# Patient Record
Sex: FEMALE | Race: BLACK OR AFRICAN AMERICAN | Employment: FULL TIME | URBAN - METROPOLITAN AREA
[De-identification: names, ages, dates, MRNs, and addresses within clinical notes are randomized per-mention and may not be internally consistent; named-entity substitution may affect disease eponyms.]

---

## 2017-01-03 ENCOUNTER — HOSPITAL ENCOUNTER (OUTPATIENT)
Dept: PHYSICAL THERAPY | Age: 42
Discharge: HOME | End: 2017-01-03
Payer: COMMERCIAL

## 2017-01-03 PROCEDURE — 97110 THERAPEUTIC EXERCISES: CPT

## 2017-01-03 PROCEDURE — 97014 ELECTRIC STIMULATION THERAPY: CPT

## 2017-01-03 NOTE — PROGRESS NOTES
PHYSICAL THERAPY - DAILY TREATMENT NOTE    Patient Name: Nino Rick        Date: 1/3/2017  : 1975   YES Patient  Verified  Visit #:   3     Insurance: Payor: Miriammartina Laws / Plan: Lawleandro Cifuentes / Product Type: HMO /      In time: 1:06 PM Out time: 1:36 pm   Total Treatment Time: 30         TREATMENT AREA =  Low back pain [M54.5]  Left sided sciatica [M54.32]    SUBJECTIVE  Pain Level (on 0 to 10 scale): 5  / 10-low back   Medication Changes/New allergies or changes in medical history, any new surgeries or procedures? NO    If yes, update Summary List   Subjective Functional Status/Changes:  []  No changes reported     Its a little irritated right now. I did some housework this morning . \" \"I noticed that the ice keeps it out of my leg. \" \" I noticed I have more muscle spasms\"          OBJECTIVE  Modalities Rationale:     decrease edema, decrease inflammation, decrease pain and increase tissue extensibility to improve patient's ability to perform prolonged walking, sitting    10 min [x] Estim, type/location: Prone low back                                      []  att     [x]  unatt     []  w/US     [x]  w/ice    []  w/heat    min []  Mechanical Traction: type/lbs                   []  pro   []  sup   []  int   []  cont    []  before manual    []  after manual    min []  Ultrasound, settings/location:      min []  Iontophoresis w/ dexamethasone, location:                                               []  take home patch       []  in clinic    min []  Ice     []  Heat    location/position:     min []  Vasopneumatic Device, press/temp:     min []  Other:    [x] Skin assessment post-treatment (if applicable):    [x]  intact    []  redness- no adverse reaction     []redness  adverse reaction:        22 min Therapeutic Exercise:  [x]  See flow sheet   Rationale:      increase ROM and increase strength to improve the patients ability to perform prolonged walking, sitting       4 min Therapeutic Activity: Pt instruction in proper body mechanics for golfers bend, review sleeping positioning   Rationale:    increase ROM and increase strength to improve the patients ability to perform prolonged walking, sitting          min Patient Education:  YES  Reviewed HEP   []  Progressed/Changed HEP based on: Other Objective/Functional Measures: Add FRANCESCA, golfers bend, prone press ups, supine abd draw, hip abd/add     Post Treatment Pain Level (on 0 to 10) scale:   2 / 10-low back     ASSESSMENT  Assessment/Changes in Function:   Pt able to decrease pain after prone press ups. Unable to fully abolish pain during session       []  See Progress Note/Recertification   Patient will continue to benefit from skilled PT services to modify and progress therapeutic interventions, address functional mobility deficits, address ROM deficits, address strength deficits, analyze and modify body mechanics/ergonomics and instruct in home and community integration to attain remaining goals.    Progress toward goals / Updated goals:    Partially met STGs  #1,2 for HEP and centralization of sx     PLAN  []  Upgrade activities as tolerated YES Continue plan of care   []  Discharge due to :    []  Other:      Therapist: Sally Roque PTA    Date: 1/3/2017 Time: 1:36 PM     Future Appointments  Date Time Provider Sabrina Barcenas   1/5/2017 12:00 PM [facility]   1/9/2017 12:30 PM Marcie [facility]   1/11/2017 12:30 PM Sally Roque PTA [facility]   1/17/2017 12:30 PM Sally Roque PTA [facility]

## 2017-01-05 ENCOUNTER — HOSPITAL ENCOUNTER (OUTPATIENT)
Dept: PHYSICAL THERAPY | Age: 42
End: 2017-01-05
Payer: COMMERCIAL

## 2017-01-11 ENCOUNTER — HOSPITAL ENCOUNTER (OUTPATIENT)
Dept: PHYSICAL THERAPY | Age: 42
Discharge: HOME | End: 2017-01-11
Payer: COMMERCIAL

## 2017-01-11 PROCEDURE — 97110 THERAPEUTIC EXERCISES: CPT

## 2017-01-11 PROCEDURE — 97140 MANUAL THERAPY 1/> REGIONS: CPT

## 2017-01-11 PROCEDURE — 97014 ELECTRIC STIMULATION THERAPY: CPT

## 2017-01-11 NOTE — PROGRESS NOTES
PHYSICAL THERAPY - DAILY TREATMENT NOTE    Patient Name: Ramon Glassboro        Date: 2017  : 1975   YES Patient  Verified  Visit #:  4    Insurance: Payor: Paco Barroso / Plan: Ifeanyi Else / Product Type: HMO /      In time: 12:39 pm Out time: 1:27 pm   Total Treatment Time: 48     TREATMENT AREA =  Low back pain [M54.5]  Left sided sciatica [M54.32]    SUBJECTIVE  Pain Level (on 0 to 10 scale):  4  / 10-low back   Medication Changes/New allergies or changes in medical history, any new surgeries or procedures? NO    If yes, update Summary List   Subjective Functional Status/Changes:  []  No changes reported     \"It wasn't hurting today until I turned in my chair. \" pt reports not doing much  \"Its been in my back a lot more. Sometimes it just feels like someone is squeezing me around my hips and back. \"  The night before last night it 7-8/10.  I took a Motrin I didn't want to take a percoet     OBJECTIVE  Modalities Rationale:     decrease edema, decrease inflammation, decrease pain and increase tissue extensibility to improve patient's ability to perform prolonged sitting, standing,walking  10 min [x] Estim, type/location: IFC low back                                     []  att     [x]  unatt     []  w/US     [x]  w/ice    []  w/heat    min []  Mechanical Traction: type/lbs                   []  pro   []  sup   []  int   []  cont    []  before manual    []  after manual    min []  Ultrasound, settings/location:      min []  Iontophoresis w/ dexamethasone, location:                                               []  take home patch       []  in clinic    min []  Ice     []  Heat    location/position:     min []  Vasopneumatic Device, press/temp:     min []  Other:    [x] Skin assessment post-treatment (if applicable):    [x]  intact    []  redness- no adverse reaction     []redness  adverse reaction:        28 min Therapeutic Exercise:  [x]  See flow sheet   Rationale:      increase ROM and increase strength to improve the patients ability to perform prolonged sitting, standing,walking    10 min Manual Therapy: Prone DTM/STM to B Lumbar paraspinals, L piriformis release   Rationale:      decrease pain, increase ROM, increase tissue extensibility and decrease trigger points to improve patient's ability to perform prolonged sitting, standing,walking     min Patient Education:  YES  Reviewed HEP   []  Progressed/Changed HEP based on: Other Objective/Functional Measures:  No radicular sx x ~ 1 week  Add bent knee fall out, dead bug, supine ham stretch with belt   Post Treatment Pain Level (on 0 to 10) scale:   0  / 10     ASSESSMENT  Assessment/Changes in Function:   Review proper body mechanics in sitting, advanced gentle core stabilization exercise. Add manual per plan of care to improve tissue mobility, decrease ms tension B L/S paraspinals. Good pain release after session     []  See Progress Note/Recertification   Patient will continue to benefit from skilled PT services to modify and progress therapeutic interventions, address functional mobility deficits, address ROM deficits, address strength deficits, analyze and address soft tissue restrictions, assess and modify postural abnormalities and instruct in home and community integration to attain remaining goals.    Progress toward goals / Updated goals:  Partially met STG #1,2 for HEP, decreasing radicular sx     PLAN  []  Upgrade activities as tolerated YES Continue plan of care   []  Discharge due to :    []  Other:      Therapist: Altagracia Payan PTA    Date: 1/11/2017 Time: 1:27  PM     Future Appointments  Date Time Provider Sabrina Barcenas   1/17/2017 12:30 PM Altagracia Payan PTA [facility]

## 2017-01-17 ENCOUNTER — HOSPITAL ENCOUNTER (OUTPATIENT)
Dept: PHYSICAL THERAPY | Age: 42
Discharge: HOME | End: 2017-01-17
Payer: COMMERCIAL

## 2017-01-17 PROCEDURE — 97110 THERAPEUTIC EXERCISES: CPT

## 2017-01-17 PROCEDURE — 97140 MANUAL THERAPY 1/> REGIONS: CPT

## 2017-01-17 NOTE — PROGRESS NOTES
PHYSICAL THERAPY - DAILY TREATMENT NOTE    Patient Name: Ole Leblanc        Date: 2017  : 1975   YES Patient  Verified  Visit #:   5     Insurance: Payor: Harsha Montiel / Plan: Angel Meier / Product Type: HMO /      In time: 12:50 pm Out time: 1:43 pm   Total Treatment Time: 53     TREATMENT AREA =  Low back pain [M54.5]  Left sided sciatica [M54.32]    SUBJECTIVE  Pain Level (on 0 to 10 scale):  5 / 10-Left    Medication Changes/New allergies or changes in medical history, any new surgeries or procedures? yes    If yes, update Summary List   Subjective Functional Status/Changes:  []  No changes reported   \"I've been doing really well. I haven't 4 or 5 days until this morning. \"  MRI scheduled today. OBJECTIVE  Modalities Rationale:   Na        44 min Therapeutic Exercise:  [x]  See flow sheet   Rationale:      increase ROM and increase strength to improve the patients ability to perform prolonged sitting     9 min Manual Therapy: Prone DTM/STM to B Lumbar paraspinals, L piriformis release   Rationale:      decrease pain, increase ROM, increase tissue extensibility and decrease trigger points to improve patient's ability to perform prolonged sitting, standing       min Patient Education:  YES  Reviewed HEP   []  Progressed/Changed HEP based on: Other Objective/Functional Measures:     updated written HEP  Add supine 3 way SLR, post hip stretch     Post Treatment Pain Level (on 0 to 10) scale:   3  / 10     ASSESSMENT  Assessment/Changes in Function:     See progress note   []  See Progress Note/Recertification   Patient will continue to benefit from skilled PT services to modify and progress therapeutic interventions, address functional mobility deficits, address ROM deficits, address strength deficits, analyze and address soft tissue restrictions and instruct in home and community integration to attain remaining goals.    Progress toward goals / Updated goals:    See progress note     PLAN  []  Upgrade activities as tolerated YES Continue plan of care   []  Discharge due to :    []  Other:      Therapist: Efren Alvarez PTA    Date: 1/17/2017 Time: 1: 43 PM     Future Appointments  Date Time Provider Sabrina Barcenas   1/19/2017 2:30 PM Domitila Bowens [facility]   1/24/2017 12:30 PM Efren Alvarez PTA [facility]   1/26/2017 12:00 PM Efren Alvarez PTA [facility]

## 2017-01-17 NOTE — PROGRESS NOTES
[address] [facility] PHYSICAL THERAPY AT [address], [place] - Phone: [phone]  Fax: [phone]  PROGRESS NOTE  Patient Name: Chucho Walters : 1975   Treatment/Medical Diagnosis: Low back pain [M54.5]  Left sided sciatica [M54.32]   Referral Source: William Ramirez MD     Date of Initial Visit: 16 Attended Visits: 5 Missed Visits: 2     SUMMARY OF TREATMENT  Pt has completed 5 of -18 visit per original plan of care that have consisted of Therapeutic exercise for lumbar stabilization, ROM, pt education in body mechanics/positioning, activity modification   CURRENT STATUS  Pt has made good progress in centralization of sx, improving Lumbar ROM, and core strength. AROM trunk ext: ~ 30%,  FF: 90%, L SB: 50%-pain L LB, R SB: 70%  Pain: 2-5/10 with sitting, standing, prolonged walking  Functional improvements: decreased radicular sx in L LE. Pt would benefit from continued skilled Physical Therapy intervention to improve flexibility, ROM, decrease pain , core strength, and for education on posture, body mechanics to improve overall functional status and quality of life. Goal/Measure of Progress Goal Met? 1. Patient performing daily HEP. Status at last Eval: na Current Status: Pt instructed in initial HEP. yes   2. Patient able to centralize symptoms with positioning and or exercise to improve ability to stand. Status at last Eval: LLE sx to calf and toes Current Status: No radicular sx x 1-2 weeks yes   3. Increase FOTO to 39 indicating improved function and quality of life. Status at last Eval: 29 Current Status: 45 yes     New Goals to be achieved in _4__  weeks:  1. Patient independent with HEP and able to demo proper body mechanics for floor to chest lifting. 2.  Patient will increase trunk AROM to 50-75% of normal to increase ability to perform household chores.    3.  Increase FOTO to 49 indicating improved function and quality of life. 4.  Increase l hip abd and extension to 4+/5 to facilitate walking. RECOMMENDATIONS  Plan to continue 2x per week x 4 weeks. If you have any questions/comments please contact us directly at  [phone]. Thank you for allowing us to assist in the care of your patient. LPTA Signature: René Castellano PTA  Date: 1/17/2017   PT Signature: Darylene Dike, PT Time: 12:58 PM   NOTE TO PHYSICIAN:  PLEASE COMPLETE THE ORDERS BELOW AND FAX TO   [facility]: [phone]. If you are unable to process this request in 24 hours please contact our office:  [phone].    ___ I have read the above report and request that my patient continue as recommended.   ___ I have read the above report and request that my patient continue therapy with the following changes/special instructions:_________________________________________________________   ___ I have read the above report and request that my patient be discharged from therapy.      Physician Signature:        Date:       Time:

## 2017-01-19 ENCOUNTER — HOSPITAL ENCOUNTER (OUTPATIENT)
Dept: PHYSICAL THERAPY | Age: 42
Discharge: HOME | End: 2017-01-19
Payer: COMMERCIAL

## 2017-01-19 PROCEDURE — 97014 ELECTRIC STIMULATION THERAPY: CPT

## 2017-01-19 PROCEDURE — 97110 THERAPEUTIC EXERCISES: CPT

## 2017-01-19 PROCEDURE — 97140 MANUAL THERAPY 1/> REGIONS: CPT

## 2017-01-19 NOTE — PROGRESS NOTES
PHYSICAL THERAPY - DAILY TREATMENT NOTE    Patient Name: Brenda Hodgkin        Date: 2017  : 1975   YES Patient  Verified  Visit #:   6     Insurance: Payor: Manjit Rick / Plan: Maddy Lynn / Product Type: HMO /      In time: 2:27 pm Out time: 3:15 pm   Total Treatment Time: 48     TREATMENT AREA =  Low back pain [M54.5]  Left sided sciatica [M54.32]    SUBJECTIVE  Pain Level (on 0 to 10 scale):  3  / 10- low back L lateral thigh   Medication Changes/New allergies or changes in medical history, any new surgeries or procedures? NO    If yes, update Summary List   Subjective Functional Status/Changes:  []  No changes reported   \"Last night I had to help someone move some clothes. I had back pain last night and this morning I had some leg pain this morning. \" \" The good thing is I'm having more better days than worse days.  \"         OBJECTIVE  Modalities Rationale:     decrease edema, decrease inflammation, decrease pain and increase tissue extensibility to improve patient's ability to perform bending, lifting  10 min [x] Estim, type/location: IF L/S prone                                      []  att     [x]  unatt     []  w/US     [x]  w/ice    []  w/heat    min []  Mechanical Traction: type/lbs                   []  pro   []  sup   []  int   []  cont    []  before manual    []  after manual    min []  Ultrasound, settings/location:      min []  Iontophoresis w/ dexamethasone, location:                                               []  take home patch       []  in clinic    min []  Ice     []  Heat    location/position:     min []  Vasopneumatic Device, press/temp:     min []  Other:    [x] Skin assessment post-treatment (if applicable):    [x]  intact    []  redness- no adverse reaction     []redness  adverse reaction:        26 min Therapeutic Exercise:  [x]  See flow sheet   Rationale:      increase ROM and increase strength to improve the patients ability to perform lifting, bending    12 min Manual Therapy: L piriformis release , DTM/STM to B L/S    Rationale:      decrease pain, increase ROM, increase tissue extensibility and decrease trigger points to improve patient's ability to  perform lifting, bending       min Patient Education:  YES  Reviewed HEP   []  Progressed/Changed HEP based on: Other Objective/Functional Measures:    Pt education in activity modification for pain centralization. Post Treatment Pain Level (on 0 to 10) scale:   0  / 10     ASSESSMENT  Assessment/Changes in Function:   Pt able to abolish pain with extension based exercise. Noted decreased muscle tension L piriformis after manual techniques     []  See Progress Note/Recertification   Patient will continue to benefit from skilled PT services to modify and progress therapeutic interventions, address functional mobility deficits, address ROM deficits, address strength deficits, analyze and address soft tissue restrictions and instruct in home and community integration to attain remaining goals. Progress toward goals / Updated goals:  1. Patient independent with HEP and able to demo proper body mechanics for floor to chest lifting. 2.  Patient will increase trunk AROM to 50-75% of normal to increase ability to perform household chores. 3.  Increase FOTO to 49 indicating improved function and quality of life.    4.  Increase l hip abd and extension to 4+/5 to facilitate walking          PLAN  []  Upgrade activities as tolerated YES Continue plan of care   []  Discharge due to :    []  Other:      Therapist: Karan Waters PTA    Date: 1/19/2017 Time: 3:15  PM     Future Appointments  Date Time Provider Sabrina Barcenas   1/24/2017 12:30 PM Yossi Banuelos [facility]   1/26/2017 12:00 PM Karan Waters PTA [facility]

## 2017-01-24 ENCOUNTER — HOSPITAL ENCOUNTER (OUTPATIENT)
Dept: PHYSICAL THERAPY | Age: 42
Discharge: HOME | End: 2017-01-24
Payer: COMMERCIAL

## 2017-01-24 PROCEDURE — 97140 MANUAL THERAPY 1/> REGIONS: CPT

## 2017-01-24 PROCEDURE — 97110 THERAPEUTIC EXERCISES: CPT

## 2017-01-24 PROCEDURE — 97014 ELECTRIC STIMULATION THERAPY: CPT

## 2017-01-24 NOTE — PROGRESS NOTES
PHYSICAL THERAPY - DAILY TREATMENT NOTE    Patient Name: Britney Galvez        Date: 2017  : 1975   YES Patient  Verified  Visit #:   7     Insurance: Payor: Radha Ness / Plan: Katherine Potts / Product Type: HMO /      In time: 12:46 pm Out time: 1:36 pm   Total Treatment Time: 50     TREATMENT AREA =  Low back pain [M54.5]  Left sided sciatica [M54.32]    SUBJECTIVE  Pain Level (on 0 to 10 scale):  0  / 10   Medication Changes/New allergies or changes in medical history, any new surgeries or procedures? NO    If yes, update Summary List   Subjective Functional Status/Changes:  []  No changes reported     \"I woke with pain. It could of been maybe it was the way I slept. It was like a 3/10 but I didn't have to take  anything. \"         OBJECTIVE  Modalities Rationale:     decrease edema, decrease inflammation, decrease pain and increase tissue extensibility to improve patient's ability to sit , stand, walk  10 min [x] Estim, type/location: IFC low back                                      []  att     [x]  unatt     []  w/US     []  w/ice    []  w/heat    min []  Mechanical Traction: type/lbs                   []  pro   []  sup   []  int   []  cont    []  before manual    []  after manual    min []  Ultrasound, settings/location:      min []  Iontophoresis w/ dexamethasone, location:                                               []  take home patch       []  in clinic    min []  Ice     []  Heat    location/position:     min []  Vasopneumatic Device, press/temp:     min []  Other:    [x] Skin assessment post-treatment (if applicable):    [x]  intact    []  redness- no adverse reaction     []redness  adverse reaction:        29 min Therapeutic Exercise:  [x]  See flow sheet   Rationale:      increase ROM and increase strength to improve the patients ability to sit , stand, walk     11 min Manual Therapy: Prone B piriformis release, DTM/STM to B lumbar paraspinals with TrPt release Rationale:      decrease pain, increase ROM, increase tissue extensibility and decrease trigger points to improve patient's ability to sit , stand, walk       min Patient Education:  YES  Reviewed HEP   []  Progressed/Changed HEP based on: Other Objective/Functional Measures:  Review HEP, use of towel roll for neutral spine positioning  Add prone hip extension, mini squat with core   Post Treatment Pain Level (on 0 to 10) scale:   0  / 10     ASSESSMENT  Assessment/Changes in Function:   Slow steady progress toward LTG #1, 2 for HEP & Trunk AROM  Advanced core strengthening, LE strengthening  Prone trunk AROM ext: 50%     []  See Progress Note/Recertification   Patient will continue to benefit from skilled PT services to modify and progress therapeutic interventions, address functional mobility deficits, address ROM deficits, address strength deficits, analyze and address soft tissue restrictions and instruct in home and community integration to attain remaining goals. Progress toward goals / Updated goals:  1. Patient independent with HEP and able to demo proper body mechanics for floor to chest lifting. 2.  Patient will increase trunk AROM to 50-75% of normal to increase ability to perform household chores. 3.  Increase FOTO to 49 indicating improved function and quality of life.    4.  Increase l hip abd and extension to 4+/5 to facilitate walking          PLAN  []  Upgrade activities as tolerated YES Continue plan of care   []  Discharge due to :    []  Other:      Therapist: Yasmani Barnhart PTA    Date: 1/24/2017 Time: 1:36 PM     Future Appointments  Date Time Provider Sabrina Barcenas   1/26/2017 12:00 PM Yasmani Barnhart PTA [facility]

## 2017-01-26 ENCOUNTER — HOSPITAL ENCOUNTER (OUTPATIENT)
Dept: PHYSICAL THERAPY | Age: 42
Discharge: HOME | End: 2017-01-26
Payer: COMMERCIAL

## 2017-01-26 PROCEDURE — 97110 THERAPEUTIC EXERCISES: CPT

## 2017-01-26 PROCEDURE — 97014 ELECTRIC STIMULATION THERAPY: CPT

## 2017-01-26 PROCEDURE — 97140 MANUAL THERAPY 1/> REGIONS: CPT

## 2017-01-26 NOTE — PROGRESS NOTES
PHYSICAL THERAPY - DAILY TREATMENT NOTE    Patient Name: Maximus Solorio        Date: 2017  : 1975   YES Patient  Verified  Visit #:  8     Insurance: Payor: Rigo Nelson / Plan: Lee Em / Product Type: HMO /      In time: 12:08 pm Out time: 12:50 pm   Total Treatment Time: 42         TREATMENT AREA =  Low back pain [M54.5]  Left sided sciatica [M54.32]    SUBJECTIVE  Pain Level (on 0 to 10 scale):  3 / 10   Medication Changes/New allergies or changes in medical history, any new surgeries or procedures? NO    If yes, update Summary List   Subjective Functional Status/Changes:  []  No changes reported     :Last night was like a 6/10. I was in training yesterday for like 3 hours. \"         OBJECTIVE  Modalities Rationale:     decrease edema, decrease inflammation, decrease pain and increase tissue extensibility to improve patient's ability to perform prolonged sitting, standing  10 min [x] Estim, type/location: IFC low back prone                                     []  att     [x]  unatt     []  w/US     [x]  w/ice    []  w/heat    min []  Mechanical Traction: type/lbs                   []  pro   []  sup   []  int   []  cont    []  before manual    []  after manual    min []  Ultrasound, settings/location:      min []  Iontophoresis w/ dexamethasone, location:                                               []  take home patch       []  in clinic    min []  Ice     []  Heat    location/position:     min []  Vasopneumatic Device, press/temp:     min []  Other:    [x] Skin assessment post-treatment (if applicable):    [x]  intact    []  redness- no adverse reaction     []redness  adverse reaction:        20 min Therapeutic Exercise:  [x]  See flow sheet   Rationale:      increase ROM and increase strength to improve the patients ability to prolonged sitting     12 min Manual Therapy: Prone DTM/STM to B lumbar paraspinals   Rationale:      decrease pain, increase ROM, increase tissue extensibility and decrease trigger points to improve patient's ability to improve tissue mobility for prolonged standing, walking       min Patient Education:  YES  Reviewed HEP   []  Progressed/Changed HEP based on: Other Objective/Functional Measures:  Review standing core strengthening exercise     Post Treatment Pain Level (on 0 to 10) scale:   0  / 10     ASSESSMENT  Assessment/Changes in Function:   VCs & demo for review of proper mini squat technique for hip hinge, core engagement, equal weight bearing through B LE's. Decreased core stabilization exercise secondary to increase ms tension/pain this session. Resume as tolerated. FRANCESCA eased LBP. Unable to abolish pain with exercise. Noted increased ms tension in B lumbar paraspinals, R piriformis regions     []  See Progress Note/Recertification   Patient will continue to benefit from skilled PT services to modify and progress therapeutic interventions, address functional mobility deficits, address ROM deficits, address strength deficits, analyze and address soft tissue restrictions and instruct in home and community integration to attain remaining goals. Progress toward goals / Updated goals:  1. Patient independent with HEP and able to demo proper body mechanics for floor to chest lifting. 2.  Patient will increase trunk AROM to 50-75% of normal to increase ability to perform household chores. 3.  Increase FOTO to 49 indicating improved function and quality of life.    4.  Increase l hip abd and extension to 4+/5 to facilitate walking          PLAN  []  Upgrade activities as tolerated YES Continue plan of care   []  Discharge due to :    []  Other:      Therapist: Cristino Pallas, PTA    Date: 1/26/2017 Time: 12:50 PM     Future Appointments  Date Time Provider Sabrina Barcenas   1/31/2017 12:30 PM Cristino Pallas, PTA [facility]

## 2017-01-30 ENCOUNTER — HOSPITAL ENCOUNTER (OUTPATIENT)
Dept: PHYSICAL THERAPY | Age: 42
Discharge: HOME | End: 2017-01-30
Payer: COMMERCIAL

## 2017-01-30 PROCEDURE — 97014 ELECTRIC STIMULATION THERAPY: CPT

## 2017-01-30 PROCEDURE — 97140 MANUAL THERAPY 1/> REGIONS: CPT

## 2017-01-30 PROCEDURE — 97110 THERAPEUTIC EXERCISES: CPT

## 2017-01-30 NOTE — PROGRESS NOTES
PHYSICAL THERAPY - DAILY TREATMENT NOTE    Patient Name: Newton Laser        Date: 2017  : 1975   YES Patient  Verified  Visit #:     Insurance: Payor: Martha Ramos / Plan: Dorita Hands / Product Type: HMO /      In time: 12:16 pm Out time: 1:03 pm   Total Treatment Time: 47         TREATMENT AREA =  Low back pain [M54.5]  Left sided sciatica [M54.32]    SUBJECTIVE  Pain Level (on 0 to 10 scale):  5  / 10-low back   Medication Changes/New allergies or changes in medical history, any new surgeries or procedures? NO    If yes, update Summary List   Subjective Functional Status/Changes:  []  No changes reported   Pt reports radicular pain-  \"just once over the weekend and I iced and took a Percocet\" pt reports her low back and HA did not start until she went to work. She was very busy and thinks it may have come from her swivel chairs.          OBJECTIVE  Modalities Rationale:     decrease edema, decrease inflammation, decrease pain and increase tissue extensibility to improve patient's ability to prolonged sitting  10 min [x] Estim, type/location: IFC low back, post hip                                     []  att     [x]  unatt     []  w/US     [x]  w/ice    []  w/heat    min []  Mechanical Traction: type/lbs                   []  pro   []  sup   []  int   []  cont    []  before manual    []  after manual    min []  Ultrasound, settings/location:      min []  Iontophoresis w/ dexamethasone, location:                                               []  take home patch       []  in clinic    min []  Ice     []  Heat    location/position:     min []  Vasopneumatic Device, press/temp:     min []  Other:    [x] Skin assessment post-treatment (if applicable):    [x]  intact    []  redness- no adverse reaction     []redness  adverse reaction:        25 min Therapeutic Exercise:  [x]  See flow sheet   Rationale:      increase ROM and increase strength to improve the patients ability to prolonged sitting    12 min Manual Therapy: Prone DTM/STM to B lumbar paraspinals, L piriformis release   Rationale:      decrease pain, increase ROM, increase tissue extensibility and decrease trigger points to improve patient's ability to prolonged sitting       min Patient Education:  YES  Reviewed HEP   []  Progressed/Changed HEP based on: Other Objective/Functional Measures:    Prone trunk extension AROM: ~ 50%     Post Treatment Pain Level (on 0 to 10) scale:   3  / 10-low back     ASSESSMENT  Assessment/Changes in Function:   Resume supine exercise . Pt would benefit from continued core stabilization strengthening. Pt partially met LTG #2 for trunk AROM     []  See Progress Note/Recertification   Patient will continue to benefit from skilled PT services to modify and progress therapeutic interventions, address functional mobility deficits, address ROM deficits, address strength deficits and instruct in home and community integration to attain remaining goals. Progress toward goals / Updated goals:  1. Patient independent with HEP and able to demo proper body mechanics for floor to chest lifting. 2.  Patient will increase trunk AROM to 50-75% of normal to increase ability to perform household chores. 3.  Increase FOTO to 49 indicating improved function and quality of life.    4.  Increase l hip abd and extension to 4+/5 to facilitate walking          PLAN  []  Upgrade activities as tolerated YES Continue plan of care   []  Discharge due to :    []  Other:      Therapist: Anne Hitchcock PTA    Date: 1/30/2017 Time: 1:03 PM     Future Appointments  Date Time Provider Sabrina Barcenas   [facility]   2/2/2017 11:30 AM Anne Hitchcock PTA [facility]

## 2017-01-31 ENCOUNTER — APPOINTMENT (OUTPATIENT)
Dept: PHYSICAL THERAPY | Age: 42
End: 2017-01-31
Payer: COMMERCIAL

## 2017-02-02 ENCOUNTER — HOSPITAL ENCOUNTER (OUTPATIENT)
Dept: PHYSICAL THERAPY | Age: 42
Discharge: HOME | End: 2017-02-02
Payer: COMMERCIAL

## 2017-02-02 PROCEDURE — 97140 MANUAL THERAPY 1/> REGIONS: CPT

## 2017-02-02 PROCEDURE — 97110 THERAPEUTIC EXERCISES: CPT

## 2017-02-02 NOTE — PROGRESS NOTES
PHYSICAL THERAPY - DAILY TREATMENT NOTE    Patient Name: Ole Leblanc        Date: 2017  : 1975   YES Patient  Verified  Visit #:  10  of   12-16  Insurance: Payor: Harsha Montiel / Plan: Angel Meier / Product Type: HMO /      In time: 11:40 am Out time: 12:16 pm   Total Treatment Time: 36     TREATMENT AREA =  Low back pain [M54.5]  Left sided sciatica [M54.32]    SUBJECTIVE  Pain Level (on 0 to 10 scale):  3  / 10   Medication Changes/New allergies or changes in medical history, any new surgeries or procedures? NO    If yes, update Summary List   Subjective Functional Status/Changes:  []  No changes reported     Pt reports low back and pain that goes from anterior thigh to L knee since yesterday. RTW full time Tuesday. Pt reports Wednesday was a non stop day and pain gradually increased throughout the day         OBJECTIVE  Modalities Rationale: na        23 min Therapeutic Exercise:  [x]  See flow sheet   Rationale:      increase ROM and increase strength to improve the patients ability to perform sitting, standing , walking     13 min Manual Therapy: Prone DTM/STM to B lumbar paraspinals, L piriformis release   Rationale:      decrease pain, increase ROM, increase tissue extensibility and decrease trigger points to improve patient's ability to perform sitting, standing , walking         min Patient Education:  YES  Reviewed HEP   []  Progressed/Changed HEP based on: Other Objective/Functional Measures:    Review positioning, HEP, activity modification for pain management     Post Treatment Pain Level (on 0 to 10) scale:   2 / 10- Left hip     ASSESSMENT  Assessment/Changes in Function:   Hold estim & ice secondary to pt time constraints. Prone extension based exercise centralized pain to low back. Unable to abolish with exercise.  TrPt tenderness in L piriformis     []  See Progress Note/Recertification   Patient will continue to benefit from skilled PT services to modify and progress therapeutic interventions, address functional mobility deficits, address ROM deficits, address strength deficits, analyze and address soft tissue restrictions and instruct in home and community integration to attain remaining goals. Progress toward goals / Updated goals:  . Patient independent with HEP and able to demo proper body mechanics for floor to chest lifting. 2.  Patient will increase trunk AROM to 50-75% of normal to increase ability to perform household chores. 3.  Increase FOTO to 49 indicating improved function and quality of life. 4.  Increase l hip abd and extension to 4+/5 to facilitate walking          PLAN  []  Upgrade activities as tolerated YES Continue plan of care   []  Discharge due to :    []  Other:      Therapist: Fawn Parada PTA    Date: 2/2/2017 Time: 12:16 pm     No future appointments.

## 2017-02-06 ENCOUNTER — HOSPITAL ENCOUNTER (OUTPATIENT)
Dept: PHYSICAL THERAPY | Age: 42
Discharge: HOME | End: 2017-02-06
Payer: COMMERCIAL

## 2017-02-06 PROCEDURE — 97014 ELECTRIC STIMULATION THERAPY: CPT

## 2017-02-06 PROCEDURE — 97110 THERAPEUTIC EXERCISES: CPT

## 2017-02-06 NOTE — PROGRESS NOTES
PHYSICAL THERAPY - DAILY TREATMENT NOTE    Patient Name: Nicholas Hunter        Date: 2017  : 1975   YES Patient  Verified  Visit #:    Insurance: Payor: Obdulia Unger / Plan: Najma Avilez / Product Type: HMO /      In time: 12:03 pm Out time: 12:45 pm   Total Treatment Time: 42     TREATMENT AREA =  Low back pain [M54.5]  Left sided sciatica [M54.32]    SUBJECTIVE  Pain Level (on 0 to 10 scale):  0  / 10   Medication Changes/New allergies or changes in medical history, any new surgeries or procedures? NO    If yes, update Summary List   Subjective Functional Status/Changes:  []  No changes reported     \"I rested over the weekend. I had a little pain yesterday. \"         OBJECTIVE  Modalities Rationale:     decrease edema, decrease inflammation, decrease pain and increase tissue extensibility to improve patient's ability to prolonged sitting, standing, walking  10 min [x] Estim, type/location: IFC low back                                     []  att     [x]  unatt     []  w/US     [x]  w/ice    []  w/heat    min []  Mechanical Traction: type/lbs                   []  pro   []  sup   []  int   []  cont    []  before manual    []  after manual    min []  Ultrasound, settings/location:      min []  Iontophoresis w/ dexamethasone, location:                                               []  take home patch       []  in clinic    min []  Ice     []  Heat    location/position:     min []  Vasopneumatic Device, press/temp:     min []  Other:    [] Skin assessment post-treatment (if applicable):    []  intact    []  redness- no adverse reaction     []redness  adverse reaction:        32 min Therapeutic Exercise:  [x]  See flow sheet   Rationale:      increase ROM and increase strength to improve the patients ability to perform prolonged sitting, standing, walking      min Patient Education:  YES  Reviewed HEP   []  Progressed/Changed HEP based on:        Other Objective/Functional Measures: FOTO: 79     Post Treatment Pain Level (on 0 to 10) scale:   0/ 10     ASSESSMENT  Assessment/Changes in Function:     See progress note     [x]  See Progress Note/Recertification   Patient will continue to benefit from skilled PT services to modify and progress therapeutic interventions, address functional mobility deficits, address ROM deficits, address strength deficits, analyze and address soft tissue restrictions and instruct in home and community integration to attain remaining goals. Progress toward goals / Updated goals:  See progress note     PLAN  []  Upgrade activities as tolerated YES Continue plan of care   []  Discharge due to :    []  Other:      Therapist: Christina Pichardo PTA    Date: 2/6/2017 Time: 12:45 PM     No future appointments.

## 2017-02-06 NOTE — PROGRESS NOTES
[address] [place] [address] PHYSICAL THERAPY AT [place]  [address] [facility] [address] - Phone: [phone]  Fax: [phone]  PROGRESS NOTE  Patient Name: Favian Fishman : 1975   Treatment/Medical Diagnosis: Low back pain [M54.5]  Left sided sciatica [M54.32]   Referral Source: Billy Rios MD     Date of Initial Visit: 16 Attended Visits: 11 Missed Visits: 2     SUMMARY OF TREATMENT  Physical Therapy treatment has consisted in Therapeuic exercise for lumbar stabilization , ROM, HEP, Manual Therapy, Interferential electrical stimulation, and ice. CURRENT STATUS  Pt reports radicular pain yesterday . Pt able to abolish pain with use of ice. Prior to yesterday no radicular sx > ~ 2 weeks. Pain: 2-3/10. Pt reports ~ 75-80% overall improvement. Functional deficits: prolonged sitting, standing, walking  Functional improvements: decreasing pain and radicular sx. Goal/Measure of Progress Goal Met? 1. Patient independent with HEP and able to demo proper body mechanics for floor to chest lifting. Status at last Eval: na Current Status: Pt instructed in initial HEP, body mechanics in lifting  partially   2. Patient will increase trunk AROM to 50-75% of normal to increase ability to perform household chores. Status at last Eval: AROM trunk ext: ~ 30%, FF: 90%, L SB: 50%-pain L LB, R SB: 70% Current Status: AROM: FF: 95%  Ext:90%-slight pain  LSB:90%  RSB: 90%  B rot: 80% yes   3. Increase FOTO to 49 indicating improved function and quality of life. Status at last Eval: 29 Current Status: 67 yes   4. Increase l hip abd and extension to 4+/5 to facilitate walking   Status at last Eval: Hip ext: L: 3+/5, L hip ABD: 3+/5 Current Status: MMT hip ext: L:  4/5, hip ABD: L: 3+/5, R: 5/5 progressing     New Goals to be achieved in _4__  treatments:  1. Patient independent with HEP and able to demo proper body mechanics for floor to chest lifting.    2. Increase FOTO to 70  indicating improved function and quality of life. 3.  Increase L hip abd and extension to 4/5 to facilitate walking     RECOMMENDATIONS  Plan to continue 2x per week x 4 weeks  If you have any questions/comments please contact us directly at  [phone]. Thank you for allowing us to assist in the care of your patient. LPTA Signature: Paula George, PTA  Date: 2/6/2017   PT Signature: Juan Pina PT Time: 12:06 PM   NOTE TO PHYSICIAN:  PLEASE COMPLETE THE ORDERS BELOW AND FAX TO   [facility] Physical Therapy: [phone]. If you are unable to process this request in 24 hours please contact our office:  [phone].    ___ I have read the above report and request that my patient continue as recommended.   ___ I have read the above report and request that my patient continue therapy with the following changes/special instructions:_________________________________________________________   ___ I have read the above report and request that my patient be discharged from therapy.      Physician Signature:        Date:       Time:

## 2017-02-10 ENCOUNTER — HOSPITAL ENCOUNTER (OUTPATIENT)
Dept: PHYSICAL THERAPY | Age: 42
Discharge: HOME | End: 2017-02-10
Payer: COMMERCIAL

## 2017-02-10 PROCEDURE — 97140 MANUAL THERAPY 1/> REGIONS: CPT

## 2017-02-10 PROCEDURE — 97110 THERAPEUTIC EXERCISES: CPT

## 2017-02-10 PROCEDURE — 97014 ELECTRIC STIMULATION THERAPY: CPT

## 2017-02-10 NOTE — PROGRESS NOTES
PHYSICAL THERAPY - DAILY TREATMENT NOTE    Patient Name: Ramon [place]        Date: 2/10/2017  : 1975   YES Patient  Verified  Visit #:   (12) 1   of   4  Insurance: Payor: Paco Barroso / Plan: Ifeanyi Else / Product Type: HMO /      In time: 11:02 am Out time: 11:53am   Total Treatment Time: 51     Medicare Time Tracking (below)   Total Timed Codes (min):  n/a 1:1 Treatment Time:  36     TREATMENT AREA =  Low back pain [M54.5]  Left sided sciatica [M54.32]  SUBJECTIVE  Pain Level (on 0 to 10 scale):  4  / 10   Medication Changes/New allergies or changes in medical history, any new surgeries or procedures? NO    If yes, update Summary List   Subjective Functional Status/Changes:  []  No changes reported     Pt states \"i did a percoset last night, yesterday was almost a 7 and a very stressful day\"       OBJECTIVE  Modalities Rationale:     decrease pain to improve patient's ability to perform functional ADLs  10 min [x] Estim, type/location: IFC low back                                     []  att     [x]  unatt     []  w/US     [x]  w/ice    []  w/heat    min []  Mechanical Traction: type/lbs                   []  pro   []  sup   []  int   []  cont    []  before manual    []  after manual    min []  Ultrasound, settings/location:      min []  Iontophoresis w/ dexamethasone, location:                                               []  take home patch       []  in clinic    min []  Ice     []  Heat    location/position:     min []  Vasopneumatic Device, press/temp:     min []  Other:    [x] Skin assessment post-treatment (if applicable):    [x]  intact    []  redness- no adverse reaction     []redness  adverse reaction:        33 (bill 28) min Therapeutic Exercise:  [x]  See flow sheet   Rationale:      increase ROM and increase strength to improve the patients ability to perform ADLs.      8 min Manual Therapy: Sustained pressure L>R piriformis,  MET to correct left post rot innominate Rationale:      decrease pain, increase ROM, increase tissue extensibility and decrease trigger points in L/S to improve patient's ability to tolerate prolonged standing. min Patient Education:  YES  Reviewed HEP   []  Progressed/Changed HEP based on: Other Objective/Functional Measures: Added wall slide and progressed hip ABD to clam  n nwith good pt tolerance and no complaints of sx. FF and stork = (+) left Si hypomobility      Post Treatment Pain Level (on 0 to 10) scale:   0  / 10     ASSESSMENT  Assessment/Changes in Function:     Pt presented with increased TTP and mm tone in left piriformis. Presented with left SI hypomobility. Progressed JEANNETTE LE strengthening as appropriate. []  See Progress Note/Recertification   Patient will continue to benefit from skilled PT services to modify and progress therapeutic interventions, address functional mobility deficits, address ROM deficits, address strength deficits, analyze and address soft tissue restrictions, analyze and cue movement patterns, analyze and modify body mechanics/ergonomics and assess and modify postural abnormalities to attain remaining goals. Progress toward goals / Updated goals:    Progressing towards newly established LTGs.       PLAN  [x]  Upgrade activities as tolerated YES Continue plan of care   []  Discharge due to :    []  Other:      Therapist: Surya Ramos DPT     Date: 2/10/2017 Time: 10:28 AM     Future Appointments  Date Time Provider Sabrina Barcenas   2/10/2017 11:00 AM [address]

## 2017-02-15 ENCOUNTER — HOSPITAL ENCOUNTER (OUTPATIENT)
Dept: PHYSICAL THERAPY | Age: 42
Discharge: HOME | End: 2017-02-15
Payer: COMMERCIAL

## 2017-02-15 PROCEDURE — 97140 MANUAL THERAPY 1/> REGIONS: CPT

## 2017-02-15 PROCEDURE — 97110 THERAPEUTIC EXERCISES: CPT

## 2017-02-15 PROCEDURE — 97014 ELECTRIC STIMULATION THERAPY: CPT

## 2017-02-15 NOTE — PROGRESS NOTES
PHYSICAL THERAPY - DAILY TREATMENT NOTE    Patient Name: Lj Conley        Date: 2/15/2017  : 1975   YES Patient  Verified  Visit #:   (22) 2   of   4  Insurance: Payor: Malka Coleman / Plan: Art Fang / Product Type: HMO /      In time: 12:33pm Out time: 1:30pm   Total Treatment Time: 62     Medicare Time Tracking (below)   Total Timed Codes (min):  n/a 1:1 Treatment Time:  n/a     TREATMENT AREA =  Low back pain [M54.5]  Left sided sciatica [M54.32]  SUBJECTIVE  Pain Level (on 0 to 10 scale):  0  / 10   Medication Changes/New allergies or changes in medical history, any new surgeries or procedures? NO    If yes, update Summary List   Subjective Functional Status/Changes:  []  No changes reported     Pt states \"I dont have any pain but i just feel tight and stiff\"         OBJECTIVE  Modalities Rationale:     decrease pain to improve patient's ability to perform functional ADLs  10 min [x] Estim, type/location: IFC to back in prone                                     []  att     [x]  unatt     []  w/US     []  w/ice    [x]  w/heat    min []  Mechanical Traction: type/lbs                   []  pro   []  sup   []  int   []  cont    []  before manual    []  after manual    min []  Ultrasound, settings/location:      min []  Iontophoresis w/ dexamethasone, location:                                               []  take home patch       []  in clinic    min []  Ice     []  Heat    location/position:     min []  Vasopneumatic Device, press/temp:     min []  Other:    [x] Skin assessment post-treatment (if applicable):    [x]  intact    []  redness- no adverse reaction     []redness  adverse reaction:        38 min Therapeutic Exercise:  [x]  See flow sheet   Rationale:      increase ROM and increase strength to improve the patients ability to perform ADLs.      9 min Manual Therapy: STM to L QL and erector spinae, sustained pressure to left piriformis   Rationale:      decrease pain, increase ROM, increase tissue extensibility and decrease trigger points in L/S to improve patient's ability to tolerate prolonged standing. min Patient Education:  YES  Reviewed HEP   []  Progressed/Changed HEP based on: Other Objective/Functional Measures: Added posterior hip stretch and SLR with core with good pt tolerance and no complaints of sx. Post Treatment Pain Level (on 0 to 10) scale:   0  / 10     ASSESSMENT  Assessment/Changes in Function:     Pt presented with increased TTP and mm tone in left QL and piriformis. Progressed flexibility therex as appropriate. []  See Progress Note/Recertification   Patient will continue to benefit from skilled PT services to modify and progress therapeutic interventions, address functional mobility deficits, address ROM deficits, address strength deficits, analyze and address soft tissue restrictions, analyze and cue movement patterns, analyze and modify body mechanics/ergonomics and assess and modify postural abnormalities to attain remaining goals. Progress toward goals / Updated goals:    Progressing towards LTG 1 and 4.       PLAN  [x]  Upgrade activities as tolerated YES Continue plan of care   []  Discharge due to :    []  Other:      Therapist: Soraya Valle DPT     Date: 2/15/2017 Time: 10:37 AM     Future Appointments  Date Time Provider Sabrina Barcenas   2/15/2017 12:30 PM Chantal Payton [facility]   2/17/2017 10:30 AM Jaja Dsouza PTA [facility]

## 2017-02-17 ENCOUNTER — HOSPITAL ENCOUNTER (OUTPATIENT)
Dept: PHYSICAL THERAPY | Age: 42
Discharge: HOME | End: 2017-02-17
Payer: COMMERCIAL

## 2017-02-17 PROCEDURE — 97014 ELECTRIC STIMULATION THERAPY: CPT

## 2017-02-17 NOTE — PROGRESS NOTES
PHYSICAL THERAPY - DAILY TREATMENT NOTE    Patient Name: Atilio Regan        Date: 2017  : 1975   YES Patient  Verified  Visit #:   (81) 3   of   8  Insurance: Payor: Albaro Barragan / Plan: Aaron Montano / Product Type: HMO /      In time: 10:30 Out time: 10:43   Total Treatment Time: 13     Medicare Time Tracking (below)   Total Timed Codes (min):  n/a 1:1 Treatment Time:  n/a     TREATMENT AREA =  Low back pain [M54.5]  Left sided sciatica [M54.32]  SUBJECTIVE  Pain Level (on 0 to 10 scale):  0  / 10   Medication Changes/New allergies or changes in medical history, any new surgeries or procedures?     NO    If yes, update Summary List   Subjective Functional Status/Changes:  []  No changes reported     Pt states \"I was in a little bit of pain yesterday, i was running around to appointments all the way in [place] news it was a lot of driving and i just think i did too  Much, today i have to be at an appointment by 11, so can i just do the electrodes because I did my exercise this morning \"         OBJECTIVE  Modalities Rationale:     decrease pain to improve patient's ability to perform functional ADLs  10 min [x] Estim, type/location: IFC to L/S in prone lying with heat                                    []  att     [x]  unatt     []  w/US     []  w/ice    [x]  w/heat    min []  Mechanical Traction: type/lbs                   []  pro   []  sup   []  int   []  cont    []  before manual    []  after manual    min []  Ultrasound, settings/location:      min []  Iontophoresis w/ dexamethasone, location:                                               []  take home patch       []  in clinic    min []  Ice     []  Heat    location/position:     min []  Vasopneumatic Device, press/temp:     min []  Other:    [x] Skin assessment post-treatment (if applicable):    [x]  intact    []  redness- no adverse reaction     []redness  adverse reaction:         min Patient Education:  YES  Reviewed HEP   [] Progressed/Changed HEP based on: Other Objective/Functional Measures:    Held therex and manual secondary to patient time constraints. Post Treatment Pain Level (on 0 to 10) scale:   0  / 10     ASSESSMENT  Assessment/Changes in Function:     Resume regular tx program next visit. []  See Progress Note/Recertification   Patient will continue to benefit from skilled PT services to modify and progress therapeutic interventions, address functional mobility deficits, address ROM deficits, address strength deficits, analyze and address soft tissue restrictions, analyze and cue movement patterns, analyze and modify body mechanics/ergonomics and assess and modify postural abnormalities to attain remaining goals. Progress toward goals / Updated goals:    Progressing towards STG 4.       PLAN  [x]  Upgrade activities as tolerated YES Continue plan of care   []  Discharge due to :    []  Other:      Therapist: Chepe Swift DPT     Date: 2/17/2017 Time: 10:32 AM     Future Appointments  Date Time Provider Sabrina Barcenas   2/20/2017 12:30 PM Vicky Jaimes PTA [facility]   2/24/2017 10:30 AM Vicky Jaimes PTA [facility]   3/1/2017 12:00 PM [address]   3/3/2017 11:30 AM [address]

## 2017-02-20 ENCOUNTER — HOSPITAL ENCOUNTER (OUTPATIENT)
Dept: PHYSICAL THERAPY | Age: 42
Discharge: HOME | End: 2017-02-20
Payer: COMMERCIAL

## 2017-02-20 PROCEDURE — 97014 ELECTRIC STIMULATION THERAPY: CPT

## 2017-02-20 PROCEDURE — 97110 THERAPEUTIC EXERCISES: CPT

## 2017-02-20 NOTE — PROGRESS NOTES
PHYSICAL THERAPY - DAILY TREATMENT NOTE    Patient Name: Hansel Bettencourt        Date: 2017  : 1975   YES Patient  Verified  Visit #:   (89) 4   of   8  Insurance: Payor: Goldie Salmon / Plan: Naun Agarwal / Product Type: HMO /      In time: 12:38 pm Out time: 1:14 pm   Total Treatment Time: 36         TREATMENT AREA =  Low back pain [M54.5]  Left sided sciatica [M54.32]    SUBJECTIVE  Pain Level (on 0 to 10 scale):  0/ 10-\"I just feel pressure. \"   Medication Changes/New allergies or changes in medical history, any new surgeries or procedures? NO    If yes, update Summary List   Subjective Functional Status/Changes:  []  No changes reported     \"I only get pain recently if I do something too long. If I stand too long or sit too long. \"  Pt reports she has pain management apt @ 1:30 pm. Pt reports MD wanted her to come to PT another 4 weeks @ last apt vs 2 weeks.        OBJECTIVE  Modalities Rationale:     decrease edema, decrease inflammation, decrease pain and increase tissue extensibility to improve patient's ability to perform prolonged sitting  10 min [x] Estim, type/location: IFC low back prone                                     []  att     [x]  unatt     []  w/US     []  w/ice    [x]  w/heat    min []  Mechanical Traction: type/lbs                   []  pro   []  sup   []  int   []  cont    []  before manual    []  after manual    min []  Ultrasound, settings/location:      min []  Iontophoresis w/ dexamethasone, location:                                               []  take home patch       []  in clinic    min []  Ice     []  Heat    location/position:     min []  Vasopneumatic Device, press/temp:     min []  Other:    [x] Skin assessment post-treatment (if applicable):    [x]  intact    []  redness- no adverse reaction     []redness  adverse reaction:        26 min Therapeutic Exercise:  [x]  See flow sheet   Rationale:      increase ROM and increase strength to improve the patients ability to perform functional ADLs         min Patient Education:  YES  Reviewed HEP   []  Progressed/Changed HEP based on: Other Objective/Functional Measures:    Hold supine exercise per flow sheet secondary to pt time constraints     Post Treatment Pain Level (on 0 to 10) scale:   0  / 10-no pressure     ASSESSMENT  Assessment/Changes in Function:     Increased wall slides to 3x 20 seconds. Pt reported increased ms tension with t band rows- VCs to adjust stance and distance from bands eased tension. []  See Progress Note/Recertification   Patient will continue to benefit from skilled PT services to modify and progress therapeutic interventions, address functional mobility deficits, address ROM deficits, address strength deficits and instruct in home and community integration to attain remaining goals. Progress toward goals / Updated goals:  1. Patient independent with HEP and able to demo proper body mechanics for floor to chest lifting. 2.  Increase FOTO to 70  indicating improved function and quality of life.    3.  Increase L hip abd and extension to 4/5 to facilitate walking-on going          PLAN  []  Upgrade activities as tolerated YES Continue plan of care   []  Discharge due to :    []  Other:      Therapist: Tosin Nguyen PTA    Date: 2/20/2017 Time: 1: 14  PM     Future Appointments  Date Time Provider Sabrina Barcenas   2/24/2017 10:30 AM Tosin Nguyen PTA [facility]   3/1/2017 12:00 PM [address]   3/3/2017 11:30 AM [address]

## 2017-02-24 ENCOUNTER — HOSPITAL ENCOUNTER (OUTPATIENT)
Dept: PHYSICAL THERAPY | Age: 42
Discharge: HOME | End: 2017-02-24
Payer: COMMERCIAL

## 2017-02-24 PROCEDURE — 97014 ELECTRIC STIMULATION THERAPY: CPT

## 2017-02-24 NOTE — PROGRESS NOTES
PHYSICAL THERAPY - DAILY TREATMENT NOTE    Patient Name: Clara Phillips        Date: 2017  : 1975   YES Patient  Verified  Visit #:   (06) 5  of   8  Insurance: Payor: Sheldon Cunningham / Plan: Sandra Bedoya / Product Type: HMO /      In time: 10:45  Out time: 11:00 am   Total Treatment Time: 10         TREATMENT AREA =  Low back pain [M54.5]  Left sided sciatica [M54.32]    SUBJECTIVE  Pain Level (on 0 to 10 scale):  0  / 10   Medication Changes/New allergies or changes in medical history, any new surgeries or procedures? NO    If yes, update Summary List   Subjective Functional Status/Changes:  []  No changes reported     Pt report pain Left thigh pain 17 \"It was a long time ago since I had any pain. \"  Saw pain medicine MD.       OBJECTIVE  Modalities Rationale:     decrease edema, decrease inflammation, decrease pain and increase tissue extensibility to improve patient's ability to perform prolonged sitting, bending, lifting  10 min [x] Estim, type/location: IFC low back prone                                     []  att     [x]  unatt     []  w/US     [x]  w/ice    []  w/heat    min []  Mechanical Traction: type/lbs                   []  pro   []  sup   []  int   []  cont    []  before manual    []  after manual    min []  Ultrasound, settings/location:      min []  Iontophoresis w/ dexamethasone, location:                                               []  take home patch       []  in clinic    min []  Ice     []  Heat    location/position:     min []  Vasopneumatic Device, press/temp:     min []  Other:    [x] Skin assessment post-treatment (if applicable):    [x]  intact    []  redness- no adverse reaction     []redness  adverse reaction:             min Patient Education:  YES  Reviewed HEP   []  Progressed/Changed HEP based on: Other Objective/Functional Measures:    Hold there ex secondary to pt time constraints.       Post Treatment Pain Level (on 0 to 10) scale:   0  / 10 ASSESSMENT  Assessment/Changes in Function:   Recommended to pt to save future visits  For progression of exercise advancement for discharge planning, continued progress toward all goals. Pt reported mild soreness in L thigh after last session. []  See Progress Note/Recertification   Patient will continue to benefit from skilled PT services to modify and progress therapeutic interventions, address functional mobility deficits, address ROM deficits and address strength deficits to attain remaining goals. Progress toward goals / Updated goals:  Resume there ex next visit.  Progress toward all current LTGs     PLAN  []  Upgrade activities as tolerated YES Continue plan of care   []  Discharge due to :    []  Other:      Therapist: India Mcpherson PTA    Date: 2/24/2017 Time: 11:00  AM     Future Appointments  Date Time Provider Sabrina Barcenas   2/24/2017 10:30 AM India Mcpherson PTA [facility]   3/1/2017 12:00 PM [address]   3/3/2017 11:30 AM [address]   3/6/2017 12:00 PM India Mcpherson PTA [facility]   3/9/2017 2:30 PM India Mcpherson PTA [facility]

## 2017-03-01 ENCOUNTER — HOSPITAL ENCOUNTER (OUTPATIENT)
Dept: PHYSICAL THERAPY | Age: 42
Discharge: HOME | End: 2017-03-01
Payer: COMMERCIAL

## 2017-03-01 PROCEDURE — 97014 ELECTRIC STIMULATION THERAPY: CPT

## 2017-03-01 PROCEDURE — 97110 THERAPEUTIC EXERCISES: CPT

## 2017-03-01 PROCEDURE — 97140 MANUAL THERAPY 1/> REGIONS: CPT

## 2017-03-01 NOTE — PROGRESS NOTES
PHYSICAL THERAPY - DAILY TREATMENT NOTE    Patient Name: Tyra Kelly        Date: 3/1/2017  : 1975   YES Patient  Verified  Visit #:   (24) 6   of   8  Insurance: Payor: Kamran Cook / Plan: José Luis Nab / Product Type: HMO /      In time: 12:11 pm Out time: 1:02pm   Total Treatment Time: 51     Medicare Time Tracking (below)   Total Timed Codes (min):  n/a 1:1 Treatment Time:  n/a     TREATMENT AREA =  Low back pain [M54.5]  Left sided sciatica [M54.32]  SUBJECTIVE  Pain Level (on 0 to 10 scale):  4  / 10   Medication Changes/New allergies or changes in medical history, any new surgeries or procedures?     NO    If yes, update Summary List   Subjective Functional Status/Changes:  []  No changes reported     Pt states \"my back started hurting, maybe 4 days ago and the pain MD gave me new meds for inflammation and pain, i took it for the first time 2 days ago, took it last night and felt better too but im still not back to where i was\"         OBJECTIVE  Modalities Rationale:     decrease pain to improve patient's ability to perform functional ADLs  10 min [x] Estim, type/location: IFC to L/S in prone over 1 pillow with ice                                     []  att     [x]  unatt     []  w/US     [x]  w/ice    []  w/heat    min []  Mechanical Traction: type/lbs                   []  pro   []  sup   []  int   []  cont    []  before manual    []  after manual    min []  Ultrasound, settings/location:      min []  Iontophoresis w/ dexamethasone, location:                                               []  take home patch       []  in clinic    min []  Ice     []  Heat    location/position:     min []  Vasopneumatic Device, press/temp:     min []  Other:    [x] Skin assessment post-treatment (if applicable):    [x]  intact    []  redness- no adverse reaction     []redness  adverse reaction:        26 min Therapeutic Exercise:  [x]  See flow sheet   Rationale:      increase ROM and increase strength to improve the patients ability to perform ADLs. 15 min Manual Therapy: Sustained pressure to R piriformis, grade 2/3 CPA mobes to R L4/5 transverse process, MET to correct left post rot innominate   Rationale:      decrease pain, increase ROM, increase tissue extensibility and decrease trigger points in L/S to improve patient's ability to tolerate prolonged standing. min Patient Education:  YES  Reviewed HEP   []  Progressed/Changed HEP based on: Other Objective/Functional Measures: Added self MET to correct left post innominate. FF and Stork = (+) L SI hypomobility      Post Treatment Pain Level (on 0 to 10) scale:   2  / 10     ASSESSMENT  Assessment/Changes in Function:     Pt presented with increased TTP and mm tone in R piriformis, left SIJ hypomobility, and R rotated L4/5 segment. Improved pain s/p manual therapy. Resume therex next visit pending patient tolerance. []  See Progress Note/Recertification   Patient will continue to benefit from skilled PT services to modify and progress therapeutic interventions, address functional mobility deficits, address ROM deficits, address strength deficits, analyze and address soft tissue restrictions, analyze and cue movement patterns, analyze and modify body mechanics/ergonomics and assess and modify postural abnormalities to attain remaining goals. Progress toward goals / Updated goals:    Slow progressowards all LTGs. Progress note due next week.       PLAN  [x]  Upgrade activities as tolerated YES Continue plan of care   []  Discharge due to :    []  Other:      Therapist: aNncy Carrera DPT     Date: 3/1/2017 Time: 12:16 PM     Future Appointments  Date Time Provider Sabrina Barcenas   3/3/2017 11:30 AM Yennifer Hines PTA [facility]   3/9/2017 2:30 PM Kumar Henson, PT [facility]

## 2017-03-03 ENCOUNTER — HOSPITAL ENCOUNTER (OUTPATIENT)
Dept: PHYSICAL THERAPY | Age: 42
Discharge: HOME | End: 2017-03-03
Payer: COMMERCIAL

## 2017-03-03 PROCEDURE — 97110 THERAPEUTIC EXERCISES: CPT

## 2017-03-03 PROCEDURE — 97014 ELECTRIC STIMULATION THERAPY: CPT

## 2017-03-03 PROCEDURE — 97140 MANUAL THERAPY 1/> REGIONS: CPT

## 2017-03-03 NOTE — PROGRESS NOTES
PHYSICAL THERAPY - DAILY TREATMENT NOTE    Patient Name: Lj Conley        Date: 3/3/2017  : 1975   YES Patient  Verified  Visit #:   7   of   8(18)  Insurance: Payor: Malka Coleman / Plan: Art Fang / Product Type: HMO /      In time: 11:48 Out time: 12:35   Total Treatment Time: 47     TREATMENT AREA =  Low back pain [M54.5]  Left sided sciatica [M54.32]    SUBJECTIVE  Pain Level (on 0 to 10 scale):  0  / 10   Medication Changes/New allergies or changes in medical history, any new surgeries or procedures? NO    If yes, update Summary List   Subjective Functional Status/Changes:  []  No changes reported     Doing well. Had a little lapse but Onida fixed it.           OBJECTIVE  Modalities Rationale:     decrease pain and increase tissue extensibility to improve patient's ability to perform functional ADLs  10 min [x] Estim, type/location: IFC/ L/S in prone                                     []  att     [x]  unatt     []  w/US     [x]  w/ice    []  w/heat    min []  Mechanical Traction: type/lbs                   []  pro   []  sup   []  int   []  cont    []  before manual    []  after manual    min []  Ultrasound, settings/location:      min []  Iontophoresis w/ dexamethasone, location:                                               []  take home patch       []  in clinic    min []  Ice     []  Heat    location/position:     min []  Vasopneumatic Device, press/temp:     min []  Other:    [x] Skin assessment post-treatment (if applicable):    [x]  intact    []  redness- no adverse reaction     []redness  adverse reaction:        27 min Therapeutic Exercise:  [x]  See flow sheet   Rationale:      increase ROM and increase strength to improve the patients ability to perform ADLs     10 min Manual Therapy: Release/DTM to R piriformis, DTM R SI region   Rationale:      decrease pain, increase ROM, increase tissue extensibility and decrease trigger points to improve patient's ability to perform ADLs       min Patient Education:  YES  Reviewed HEP   []  Progressed/Changed HEP based on: Other Objective/Functional Measures:    Restarted standing exercises     Post Treatment Pain Level (on 0 to 10) scale:   0  / 10     ASSESSMENT  Assessment/Changes in Function:     Patient able to perform all standing core exercises without pain. []  See Progress Note/Recertification   Patient will continue to benefit from skilled PT services to modify and progress therapeutic interventions, address functional mobility deficits, address ROM deficits, address strength deficits, analyze and address soft tissue restrictions, analyze and modify body mechanics/ergonomics and instruct in home and community integration to attain remaining goals.    Progress toward goals / Updated goals:    Progressing toward all LTGs     PLAN  [x]  Upgrade activities as tolerated YES Continue plan of care   []  Discharge due to :    []  Other:      Therapist: Sylvain Nuno PT    Date: 3/3/2017 Time: 12:04 PM     Future Appointments  Date Time Provider Sabrina Barcenas   3/9/2017 2:30 PM Sylvain Nuno PT [facility]

## 2017-03-06 ENCOUNTER — APPOINTMENT (OUTPATIENT)
Dept: PHYSICAL THERAPY | Age: 42
End: 2017-03-06
Payer: COMMERCIAL

## 2017-03-09 ENCOUNTER — HOSPITAL ENCOUNTER (OUTPATIENT)
Dept: PHYSICAL THERAPY | Age: 42
Discharge: HOME | End: 2017-03-09
Payer: COMMERCIAL

## 2017-03-09 PROCEDURE — 97110 THERAPEUTIC EXERCISES: CPT

## 2017-03-09 PROCEDURE — 97140 MANUAL THERAPY 1/> REGIONS: CPT

## 2017-03-09 PROCEDURE — 97014 ELECTRIC STIMULATION THERAPY: CPT

## 2017-03-09 NOTE — PROGRESS NOTES
PHYSICAL THERAPY - DAILY TREATMENT NOTE    Patient Name: Jessica Cee        Date: 3/9/2017  : 1975   YES Patient  Verified  Visit #:   (27) 8   of   8  Insurance: Payor: Ramses Yoon / Plan: Hiowt Ache / Product Type: HMO /      In time: 12:22 pm Out time: 1:20 pm   Total Treatment Time: 58         TREATMENT AREA =  Low back pain [M54.5]  Left sided sciatica [M54.32]    SUBJECTIVE  Pain Level (on 0 to 10 scale):  0  / 10   Medication Changes/New allergies or changes in medical history, any new surgeries or procedures? NO    If yes, update Summary List   Subjective Functional Status/Changes:  []  No changes reported   Pt reports she had not had any pain in her leg since last week after SI MET technique.      Pt reports pain range: 0-3/10      OBJECTIVE  Modalities Rationale:     decrease edema, decrease inflammation, decrease pain and increase tissue extensibility to improve patient's ability to perform functional ADLs  10 min [x] Estim, type/location: Prone low back IFC                                     []  att     [x]  unatt     []  w/US     [x]  w/ice    []  w/heat    min []  Mechanical Traction: type/lbs                   []  pro   []  sup   []  int   []  cont    []  before manual    []  after manual    min []  Ultrasound, settings/location:      min []  Iontophoresis w/ dexamethasone, location:                                               []  take home patch       []  in clinic    min []  Ice     []  Heat    location/position:     min []  Vasopneumatic Device, press/temp:     min []  Other:    [x] Skin assessment post-treatment (if applicable):    [x]  intact    []  redness- no adverse reaction     []redness  adverse reaction:        38 min Therapeutic Exercise:  [x]  See flow sheet   Rationale:      increase ROM and increase strength to improve the patients ability to perform bending & lifting     10 min Manual Therapy: Prone STM/DTM to B lumbar paraspinals   Rationale:      decrease pain, increase ROM, increase tissue extensibility and decrease trigger points to improve patient's ability to improve tissue mobility for bending          min Patient Education:  YES  Reviewed HEP   []  Progressed/Changed HEP based on: Other Objective/Functional Measures:  Updated written HEP. Add recovery flexion stretches. Review discharge instructions to continue HEP 2-3x per week x4 weeks as tolerated. Post Treatment Pain Level (on 0 to 10) scale:   0 / 10     ASSESSMENT  Assessment/Changes in Function:     See discharge     []  See Progress Note/Recertification   Patient will continue to benefit from skilled PT services to modify and progress therapeutic interventions, address functional mobility deficits, address ROM deficits, address strength deficits, analyze and address soft tissue restrictions, analyze and cue movement patterns and instruct in home and community integration to attain remaining goals.    Progress toward goals / Updated goals:    See discharge     PLAN  []  Upgrade activities as tolerated NO Continue plan of care   [x]  Discharge due to : Pt met goals   []  Other:      Therapist: Anne Hitchcock PTA    Date: 3/9/2017 Time: 1:20 pm     Future Appointments  Date Time Provider Sabrina Barcenas   3/9/2017 12:00 PM Anne Hitchcock PTA [facility]

## 2017-03-09 NOTE — PROGRESS NOTES
[address]  [facility] PHYSICAL THERAPY AT [facility] [address], [place] [address] - Phone: [phone]  Fax: [phone] SUMMARY  Patient Name: Maria E Garnica : 1975   Treatment/Medical Diagnosis: Low back pain [M54.5]  Left sided sciatica [M54.32]   Referral Source: Mynor Miller MD     Date of Initial Visit: 16 Attended Visits: 19 Missed Visits: 2     SUMMARY OF TREATMENT  Physical Therapy treatment has consisted in Therapeuic exercise for lumbar stabilization , ROM, HEP, Manual Therapy, Interferential electrical stimulation, and ice. CURRENT STATUS  Pt has made slow steady progress with improving lumbar stabilization strength, decreasing pain with activity, and functional ability. Pt reports pain range: 0-3/10. Pt is independent with written HEP. Pt reported on GROC +6 A great deal better. Goal/Measure of Progress Goal Met? 1. Patient independent with HEP and able to demo proper body mechanics for floor    Status at last Eval: Initial HEP instructed. Current Status: Pt is independent with written HEP and instructed in proper body mechanics for floor to chest lifting. yes   2. Increase FOTO to 70  indicating improved function and quality of life   Status at last Eval: 67 Current Status: 72 yes   3. Increase L hip abd and extension to 4/5 to facilitate walking   Status at last Eval: MMT hip ext: L: 4/5, hip ABD: L: 3+/5, R: 5/5 Current Status: MMT: L hip ext/ABD: 4/5 yes     RECOMMENDATIONS  Discontinue therapy. Progressing towards or have reached established goals. If you have any questions/comments please contact us directly at [phone]. Thank you for allowing us to assist in the care of your patient.     LPTA Signature: Karan Waters PTA Date: 3-9-17   Therapist Signature: Josemanuel Hickman DPT Time: 12:43 PM

## 2019-02-07 ENCOUNTER — HOSPITAL ENCOUNTER (OUTPATIENT)
Dept: PHYSICAL THERAPY | Age: 44
Discharge: HOME | End: 2019-02-07
Payer: COMMERCIAL

## 2019-02-07 PROCEDURE — 97530 THERAPEUTIC ACTIVITIES: CPT

## 2019-02-07 PROCEDURE — 97110 THERAPEUTIC EXERCISES: CPT

## 2019-02-07 PROCEDURE — 97161 PT EVAL LOW COMPLEX 20 MIN: CPT
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
## 2019-02-07 NOTE — PROGRESS NOTES
[facility] at the [address], [place], [address] [facility] Phone: [phone]      Fax:  [phone] Plan of Care/ Statement of Necessity for Physical Therapy Services Patient name: Ana Bahena Start of Care: 2019 Referral source: Chris Voss MD : 1975 Medical Diagnosis: Low back pain [M54.5] Onset Date:2 years, recent increase 2018 Treatment Diagnosis: mechanical low back pain Prior Hospitalization: see medical history Provider#: [id number] Medications: Verified on Patient summary List  
 Comorbidities: fibroids, asthma,  Prior Level of Function: dancing, walking, working out, traveling for work without low back or left hip pain The Plan of Care and following information is based on the information from the initial evaluation. Assessment/ key information: Pt is an 38yo female presenting to therapy with c/c low back and left hip pain ongoing for 2 years with recent increase since 2018 with insidious onset. Pt reporting having PT in the past with good results. Pt reporting fibroids found at recent OBGYN appointment possibly contributing to lumbar pain as well. Pain increases with sitting, standing up from seated position, bending forward and going up/down stairs. Pt denies red flags at this time. Pt demonstrates antalgic gait, decreased lumbar ROM, decreased glut and HS strength, negative SLR however positive left slump, poor SL balance on left, and TTP over lumbar paraspinals and right QL. Signs and symptoms consistent with mechanical dysfunction.  Pt would benefit from skilled PT services to address the above impairments to allow pt to complete ADLs and return to active lifestyle with less pain.  
  
Evaluation Complexity History MEDIUM  Complexity : 1-2 comorbidities / personal factors will impact the outcome/ POC ; Examination MEDIUM Complexity : 3 Standardized tests and measures addressing body structure, function, activity limitation and / or participation in recreation  ;Presentation LOW Complexity : Stable, uncomplicated  ;Clinical Decision Making MEDIUM Complexity : FOTO score of 26-74 Overall Complexity Rating: LOW Problem List: pain affecting function, decrease ROM, decrease strength, impaired gait/ balance, decrease ADL/ functional abilitiies, decrease activity tolerance, decrease flexibility/ joint mobility and decrease transfer abilities Treatment Plan may include any combination of the following: Therapeutic exercise, Therapeutic activities, Neuromuscular re-education, Physical agent/modality, Gait/balance training, Manual therapy, Patient education, Self Care training, Functional mobility training, Home safety training and Stair training Patient / Family readiness to learn indicated by: asking questions, trying to perform skills and interest 
Persons(s) to be included in education: patient (P) Barriers to Learning/Limitations: None Patient Goal (s): don't want my hip, back, legs to hurt. \" 
Patient Self Reported Health Status: fair Rehabilitation Potential: good Short Term Goals: STG- To be accomplished in 3 week(s): 1. Pt will be independent with HEP to encourage prophylaxis. Eval:HEP dispensed Current: NA 
  
2. Pt will be able to complete sit to stand without left LE or low back pain to demonstrate improved mechanics and functional LE strength Eval: pain with getting out of car Current: NA 
  
Long Term Goals: LTG- To be accomplished in 6 week(s): 1. Pt will demonstrate ability to sit > 2 hours without pain to be able to return to traveling for work Eval:unable for more than few minutes, not able to travel, 10/10 max pain Current: NA 
  
2.  Pt lumbar ROM will improve to [facility] to allow pt to put on socks and shoes independently Eval:unable with boyfriend helping due to pain lumbar flexion: 22 in from floor Rotation: 18.5in left         18in right  
  
Current: NA 
  
3.  Pt glut and HS strength will improve to 5/5 to allow pt to return to dancing without pain. Eval:3+/5 gluts, 4/5 HS, decreased bridge height Current: NA 
  
4.  Pt lumbar FOTO score will increase by >/=14 points to show improvement in subjective function. Eval:48 Current: will address at visit 5 
  
5. Pt hip FOTO score will increase by >/=20 points to show improvement in subjective function. Eval:37 Current: will address at visit 5 Frequency / Duration: Patient to be seen 2 times per week for 6 weeks. Patient/ Caregiver education and instruction: Diagnosis, prognosis, self care, activity modification and brace/ splint application 
 [x]  Plan of care has been reviewed with RAAD Davies 2/7/2019 4:48 PM 
_____________________________________________________________________ I certify that the above Therapy Services are being furnished while the patient is under my care. I agree with the treatment plan and certify that this therapy is necessary. [de-identified] Signature:____________Date:_________TIME:________ 
 
[employer], Date and Time must be completed for valid certification ** Please sign and return to [facility] at the [address], [address] [facility] Phone: [phone]      Fax:  [phone]

## 2019-02-18 ENCOUNTER — APPOINTMENT (OUTPATIENT)
Dept: PHYSICAL THERAPY | Age: 44
End: 2019-02-18
Payer: COMMERCIAL

## 2019-02-19 ENCOUNTER — HOSPITAL ENCOUNTER (OUTPATIENT)
Dept: NON INVASIVE DIAGNOSTICS | Age: 44
Discharge: HOME | End: 2019-02-19
Payer: COMMERCIAL

## 2019-02-19 ENCOUNTER — HOSPITAL ENCOUNTER (OUTPATIENT)
Dept: LAB | Age: 44
Discharge: HOME | End: 2019-02-19
Payer: COMMERCIAL

## 2019-02-19 DIAGNOSIS — M51.26 DISPLACEMENT OF LUMBAR INTERVERTEBRAL DISC WITHOUT MYELOPATHY: ICD-10-CM

## 2019-02-19 DIAGNOSIS — Z01.812 BLOOD TESTS PRIOR TO TREATMENT OR PROCEDURE: ICD-10-CM

## 2019-02-19 LAB
ATRIAL RATE: 71 BPM
CALCULATED P AXIS, ECG09: 17 DEGREES
CALCULATED R AXIS, ECG10: 32 DEGREES
CALCULATED T AXIS, ECG11: 42 DEGREES
DIAGNOSIS, 93000: NORMAL
FAX TO INFO,FAXT: NORMAL
FAX TO NUMBER,FAXN: NORMAL
HCT VFR BLD AUTO: 41 % (ref 35–45)
HGB BLD-MCNC: 13.2 G/DL (ref 12–16)
P-R INTERVAL, ECG05: 154 MS
POTASSIUM SERPL-SCNC: 3.5 MMOL/L (ref 3.5–5.5)
Q-T INTERVAL, ECG07: 388 MS
QRS DURATION, ECG06: 72 MS
QTC CALCULATION (BEZET), ECG08: 421 MS
VENTRICULAR RATE, ECG03: 71 BPM

## 2019-02-19 PROCEDURE — 93005 ELECTROCARDIOGRAM TRACING: CPT

## 2019-02-19 PROCEDURE — 36415 COLL VENOUS BLD VENIPUNCTURE: CPT

## 2019-02-19 PROCEDURE — 84132 ASSAY OF SERUM POTASSIUM: CPT

## 2019-02-19 PROCEDURE — 85018 HEMOGLOBIN: CPT

## 2019-02-22 ENCOUNTER — APPOINTMENT (OUTPATIENT)
Dept: PHYSICAL THERAPY | Age: 44
End: 2019-02-22
Payer: COMMERCIAL

## 2019-02-26 ENCOUNTER — APPOINTMENT (OUTPATIENT)
Dept: PHYSICAL THERAPY | Age: 44
End: 2019-02-26
Payer: COMMERCIAL

## 2019-03-01 ENCOUNTER — APPOINTMENT (OUTPATIENT)
Dept: PHYSICAL THERAPY | Age: 44
End: 2019-03-01

## 2019-03-05 ENCOUNTER — APPOINTMENT (OUTPATIENT)
Dept: PHYSICAL THERAPY | Age: 44
End: 2019-03-05

## 2019-03-07 NOTE — PROGRESS NOTES
[facility] at the [address], [place], [address] [address] [address]  Phone: [phone]      Fax:  [phone]    Discharge Summary    Patient name: Lisa Walden     Start of Care: 19  Referral source: Mahin Cooley MD    : 1975  Medical/Treatment Diagnosis: Low back pain [M54.5]  Onset Date:2 years, recent increase 2018  Prior Hospitalization: see medical history   Provider#: [id number]  Comorbidities: fibroids, asthma,   Prior Level of Function:dancing, walking, working out, traveling for work without low back or left hip pain  Medications: Verified on Patient Summary List    Visits from Start of Care: 1    Missed Visits: 0  Reporting Period : 19 to 19  Short Term Goals: STG- To be accomplished in 3 week(s):  1.  Pt will be independent with HEP to encourage prophylaxis. Eval:HEP dispensed   Current: NA     2. Pt will be able to complete sit to stand without left LE or low back pain to demonstrate improved mechanics and functional LE strength  Eval: pain with getting out of car  Current: NA     Long Term Goals: LTG- To be accomplished in 6 week(s):  1.  Pt will demonstrate ability to sit > 2 hours without pain to be able to return to traveling for work  Eval:unable for more than few minutes, not able to travel, 10/10 max pain   Current: NA     2.  Pt lumbar ROM will improve to [facility] to allow pt to put on socks and shoes independently  Eval:unable with boyfriend helping due to pain   lumbar flexion: 22 in from floor   Rotation: 18.5in left         44FZ right      Current: NA     3.  Pt glut and HS strength will improve to 5/5 to allow pt to return to dancing without pain. Eval:3+/5 gluts, 4/5 HS, decreased bridge height  Current: NA     4.  Pt lumbar FOTO score will increase by >/=14 points to show improvement in subjective function.   Eval:48  Current: will address at visit 5     5. Pt hip FOTO score will increase by >/=20 points to show improvement in subjective function. Eval:37  Current: will address at visit 5         Assessment/ Summary of Care: Pt presented to therapy with c/c low back and left hip pain ongoing for 2 years with recent increase since August 2018 with insidious onset. .Pt reporting fibroids found at recent OBGYN appointment possibly contributing to lumbar pain as well. Pt only attended initial eval due to waiting to hear back from [facility] f/u with then having surgery. Pt is ready to be discharged at this time due to recent surgery.              RECOMMENDATIONS:  [x]Discontinue therapy: []Patient has reached or is progressing toward set goals      [x]Patient is non-compliant or has abdicated      []Due to lack of appreciable progress towards set goals    Allie De La Cruzesic 3/7/2019 10:23 AM

## 2019-03-08 ENCOUNTER — APPOINTMENT (OUTPATIENT)
Dept: PHYSICAL THERAPY | Age: 44
End: 2019-03-08

## 2019-03-12 ENCOUNTER — APPOINTMENT (OUTPATIENT)
Dept: PHYSICAL THERAPY | Age: 44
End: 2019-03-12

## 2019-03-15 ENCOUNTER — APPOINTMENT (OUTPATIENT)
Dept: PHYSICAL THERAPY | Age: 44
End: 2019-03-15

## 2019-03-19 ENCOUNTER — APPOINTMENT (OUTPATIENT)
Dept: PHYSICAL THERAPY | Age: 44
End: 2019-03-19

## 2019-03-22 ENCOUNTER — APPOINTMENT (OUTPATIENT)
Dept: PHYSICAL THERAPY | Age: 44
End: 2019-03-22

## 2019-03-26 ENCOUNTER — APPOINTMENT (OUTPATIENT)
Dept: PHYSICAL THERAPY | Age: 44
End: 2019-03-26

## 2019-03-29 ENCOUNTER — APPOINTMENT (OUTPATIENT)
Dept: PHYSICAL THERAPY | Age: 44
End: 2019-03-29

## 2021-01-22 PROBLEM — M51.27 HERNIATION OF INTERVERTEBRAL DISC BETWEEN L5 AND S1: Status: ACTIVE | Noted: 2018-10-17

## 2021-01-22 PROBLEM — G89.29 CHRONIC MIDLINE LOW BACK PAIN WITH BILATERAL SCIATICA: Status: ACTIVE | Noted: 2018-10-17

## 2021-01-22 PROBLEM — J45.20 MILD INTERMITTENT ASTHMA WITHOUT COMPLICATION: Status: ACTIVE | Noted: 2019-10-30

## 2021-01-22 PROBLEM — E05.90 SUBCLINICAL HYPERTHYROIDISM: Status: ACTIVE | Noted: 2017-03-20

## 2021-01-22 PROBLEM — L30.9 ECZEMA: Status: ACTIVE | Noted: 2019-10-30

## 2021-01-22 PROBLEM — M54.41 CHRONIC MIDLINE LOW BACK PAIN WITH BILATERAL SCIATICA: Status: ACTIVE | Noted: 2018-10-17

## 2021-01-22 PROBLEM — E78.5 HYPERLIPIDEMIA: Status: ACTIVE | Noted: 2017-12-15

## 2021-01-22 PROBLEM — M54.42 CHRONIC MIDLINE LOW BACK PAIN WITH BILATERAL SCIATICA: Status: ACTIVE | Noted: 2018-10-17

## 2021-01-23 PROBLEM — R06.01 ORTHOPNEA: Status: ACTIVE | Noted: 2021-01-23

## 2021-01-23 PROBLEM — R06.02 SOB (SHORTNESS OF BREATH): Status: ACTIVE | Noted: 2021-01-23

## 2021-01-23 PROBLEM — R07.9 CHEST PAIN: Status: ACTIVE | Noted: 2021-01-23

## 2021-01-23 PROBLEM — I50.9 NEW ONSET OF CONGESTIVE HEART FAILURE (HCC): Status: ACTIVE | Noted: 2021-01-23

## 2021-01-23 PROBLEM — R60.9 EDEMA: Status: ACTIVE | Noted: 2021-01-23

## 2021-01-25 PROBLEM — R60.0 LEG EDEMA: Status: ACTIVE | Noted: 2021-01-25

## 2021-10-12 PROBLEM — N17.9 AKI (ACUTE KIDNEY INJURY) (HCC): Status: ACTIVE | Noted: 2021-10-12

## 2021-10-12 PROBLEM — U07.1 PNEUMONIA DUE TO COVID-19 VIRUS: Status: ACTIVE | Noted: 2021-10-12

## 2021-10-12 PROBLEM — U07.1 COVID-19: Status: ACTIVE | Noted: 2021-10-12

## 2021-10-12 PROBLEM — J12.82 PNEUMONIA DUE TO COVID-19 VIRUS: Status: ACTIVE | Noted: 2021-10-12

## 2022-03-24 PROBLEM — R80.9 PROTEINURIA: Status: ACTIVE | Noted: 2022-03-24
